# Patient Record
Sex: FEMALE | Race: WHITE | NOT HISPANIC OR LATINO
[De-identification: names, ages, dates, MRNs, and addresses within clinical notes are randomized per-mention and may not be internally consistent; named-entity substitution may affect disease eponyms.]

---

## 2019-11-06 ENCOUNTER — ASOB RESULT (OUTPATIENT)
Age: 69
End: 2019-11-06

## 2019-11-06 ENCOUNTER — APPOINTMENT (OUTPATIENT)
Dept: ANTEPARTUM | Facility: CLINIC | Age: 69
End: 2019-11-06
Payer: MEDICARE

## 2019-11-06 PROCEDURE — 76856 US EXAM PELVIC COMPLETE: CPT | Mod: 59

## 2019-11-06 PROCEDURE — 76830 TRANSVAGINAL US NON-OB: CPT

## 2020-10-07 ENCOUNTER — APPOINTMENT (OUTPATIENT)
Dept: ANTEPARTUM | Facility: CLINIC | Age: 70
End: 2020-10-07
Payer: MEDICARE

## 2020-10-07 ENCOUNTER — ASOB RESULT (OUTPATIENT)
Age: 70
End: 2020-10-07

## 2020-10-07 PROCEDURE — 76830 TRANSVAGINAL US NON-OB: CPT

## 2020-10-07 PROCEDURE — 76856 US EXAM PELVIC COMPLETE: CPT | Mod: 59

## 2021-03-09 ENCOUNTER — FORM ENCOUNTER (OUTPATIENT)
Age: 71
End: 2021-03-09

## 2021-03-10 ENCOUNTER — TRANSCRIPTION ENCOUNTER (OUTPATIENT)
Age: 71
End: 2021-03-10

## 2022-02-02 ENCOUNTER — APPOINTMENT (OUTPATIENT)
Dept: RHEUMATOLOGY | Facility: CLINIC | Age: 72
End: 2022-02-02

## 2022-02-08 ENCOUNTER — APPOINTMENT (OUTPATIENT)
Dept: RHEUMATOLOGY | Facility: CLINIC | Age: 72
End: 2022-02-08
Payer: MEDICARE

## 2022-02-08 ENCOUNTER — LABORATORY RESULT (OUTPATIENT)
Age: 72
End: 2022-02-08

## 2022-02-08 VITALS
WEIGHT: 162 LBS | TEMPERATURE: 98 F | RESPIRATION RATE: 17 BRPM | SYSTOLIC BLOOD PRESSURE: 140 MMHG | HEIGHT: 64 IN | DIASTOLIC BLOOD PRESSURE: 90 MMHG | BODY MASS INDEX: 27.66 KG/M2 | HEART RATE: 66 BPM | OXYGEN SATURATION: 98 %

## 2022-02-08 DIAGNOSIS — Z82.49 FAMILY HISTORY OF ISCHEMIC HEART DISEASE AND OTHER DISEASES OF THE CIRCULATORY SYSTEM: ICD-10-CM

## 2022-02-08 DIAGNOSIS — Z82.69 FAMILY HISTORY OF OTHER DISEASES OF THE MUSCULOSKELETAL SYSTEM AND CONNECTIVE TISSUE: ICD-10-CM

## 2022-02-08 DIAGNOSIS — Z86.39 PERSONAL HISTORY OF OTHER ENDOCRINE, NUTRITIONAL AND METABOLIC DISEASE: ICD-10-CM

## 2022-02-08 PROCEDURE — 99205 OFFICE O/P NEW HI 60 MIN: CPT

## 2022-02-08 RX ORDER — MULTIVIT-MIN/IRON/FOLIC ACID/K 18-600-40
CAPSULE ORAL
Refills: 0 | Status: ACTIVE | COMMUNITY

## 2022-02-08 RX ORDER — PNV NO.95/FERROUS FUM/FOLIC AC 28MG-0.8MG
TABLET ORAL
Refills: 0 | Status: ACTIVE | COMMUNITY

## 2022-02-08 RX ORDER — CALCIUM CITRATE/VITAMIN D3 200MG-6.25
TABLET ORAL
Refills: 0 | Status: ACTIVE | COMMUNITY

## 2022-02-09 LAB
ALBUMIN SERPL ELPH-MCNC: 4.7 G/DL
ALP BLD-CCNC: 60 U/L
ALT SERPL-CCNC: 12 U/L
ANION GAP SERPL CALC-SCNC: 11 MMOL/L
AST SERPL-CCNC: 17 U/L
BASOPHILS # BLD AUTO: 0.07 K/UL
BASOPHILS NFR BLD AUTO: 0.8 %
BILIRUB SERPL-MCNC: 0.4 MG/DL
BUN SERPL-MCNC: 17 MG/DL
CALCIUM SERPL-MCNC: 10.5 MG/DL
CCP AB SER IA-ACNC: <8 UNITS
CENTROMERE IGG SER-ACNC: <0.2 CD:130001892
CHLORIDE SERPL-SCNC: 104 MMOL/L
CK SERPL-CCNC: 71 U/L
CO2 SERPL-SCNC: 26 MMOL/L
CREAT SERPL-MCNC: 0.57 MG/DL
CRP SERPL-MCNC: <3 MG/L
ENA SCL70 IGG SER IA-ACNC: <0.2 AL
ENA SS-A AB SER IA-ACNC: 0.2 AL
ENA SS-B AB SER IA-ACNC: <0.2 AL
EOSINOPHIL # BLD AUTO: 0.11 K/UL
EOSINOPHIL NFR BLD AUTO: 1.3 %
ERYTHROCYTE [SEDIMENTATION RATE] IN BLOOD BY WESTERGREN METHOD: 41 MM/HR
GLUCOSE SERPL-MCNC: 84 MG/DL
HCT VFR BLD CALC: 47.6 %
HGB BLD-MCNC: 15.3 G/DL
IMM GRANULOCYTES NFR BLD AUTO: 0.2 %
LYMPHOCYTES # BLD AUTO: 2.77 K/UL
LYMPHOCYTES NFR BLD AUTO: 33.3 %
MAN DIFF?: NORMAL
MCHC RBC-ENTMCNC: 30.5 PG
MCHC RBC-ENTMCNC: 32.1 GM/DL
MCV RBC AUTO: 95 FL
MONOCYTES # BLD AUTO: 0.65 K/UL
MONOCYTES NFR BLD AUTO: 7.8 %
NEUTROPHILS # BLD AUTO: 4.69 K/UL
NEUTROPHILS NFR BLD AUTO: 56.6 %
PLATELET # BLD AUTO: 297 K/UL
POTASSIUM SERPL-SCNC: 4.4 MMOL/L
PROT SERPL-MCNC: 7.4 G/DL
RBC # BLD: 5.01 M/UL
RBC # FLD: 13.2 %
RF+CCP IGG SER-IMP: NEGATIVE
RHEUMATOID FACT SER QL: 11 IU/ML
SODIUM SERPL-SCNC: 141 MMOL/L
WBC # FLD AUTO: 8.31 K/UL

## 2022-02-09 NOTE — ASSESSMENT
[FreeTextEntry1] : 71 year old female was referred with pulmonary fibrosis.  She does not exhibit any obvious signs/symptoms of an underlying connective tissue disease at this time, other than dry eyes which can occur in Sjogren's Syndrome but is very non-specific.  I have therefore ordered some more bloodwork, including serologies, as further workup.\par She also c/o chronic pain in her knees, which was previously diagnosed as advanced osteoarthritis.  I have recommended conservative treatment, including exercises, ibuprofen and/or Tylenol prn, warm compresses, and topica; analgesics\par Pt also w/ reported hx of osteoporosis, though she says that she refused treatment.  She is due for a repeat DEXA later this month.  I will review the results once available, then will plan to further discuss treatment recommendations,

## 2022-02-09 NOTE — HISTORY OF PRESENT ILLNESS
[Arthralgias] : arthralgias [FreeTextEntry1] : 71 year old female with PMHx as listed below reports that about 1 year ago, she was incidentally found to have pulmonary fibrosis on a "renal scan".  She was referred to a pulmonologist.  PFT's were reportedly unremarkable.  She was then sent for a CT chest, which reportedly confirmed pulmonary fibrosis.  She denies any dyspnea.  (+)occasional mild dry cough.  \par Pt also c/o B/L knee pain (R>L) for >20 years - worst upon first getting up after prolonged sitting. (+)intermittent swelling.  (+)AM stiffness, usually lasting several minutes.  She saw ortho, who diagnosed her with OA. She was treated with corticosteroid injections, but they didn't help.  She was then treated with Orthovisc injections, which provided some relief.\par No F/C, no unintentional weight loss, no night sweats, no oral ulcers, no rashes, no alopecia, no photosensitivity, no dry eyes/dry mouth, no Raynaud symptoms, no focal weakness, no dysphagia\par  [Anorexia] : no anorexia [Weight Loss] : no weight loss [Malaise] : no malaise [Fever] : no fever [Chills] : no chills [Fatigue] : no fatigue [Malar Facial Rash] : no malar facial rash [Skin Lesions] : no lesions [Skin Nodules] : no skin nodules [Oral Ulcers] : no oral ulcers [Cough] : no cough [Dry Mouth] : no dry mouth [Dysphonia] : no dysphonia [Dysphagia] : no dysphagia [Shortness of Breath] : no shortness of breath [Chest Pain] : no chest pain [Joint Swelling] : no joint swelling [Joint Warmth] : no joint warmth [Joint Deformity] : no joint deformity [Decreased ROM] : no decreased range of motion [Morning Stiffness] : no morning stiffness [Falls] : no falls [Difficulty Standing] : no difficulty standing [Difficulty Walking] : no difficulty walking [Dyspnea] : no dyspnea [Myalgias] : no myalgias [Muscle Weakness] : no muscle weakness [Muscle Spasms] : no muscle spasms [Muscle Cramping] : no muscle cramping [Visual Changes] : no visual changes [Eye Pain] : no eye pain [Eye Redness] : no eye redness [Dry Eyes] : no dry eyes

## 2022-02-09 NOTE — PHYSICAL EXAM
[General Appearance - Alert] : alert [General Appearance - In No Acute Distress] : in no acute distress [Sclera] : the sclera and conjunctiva were normal [Outer Ear] : the ears and nose were normal in appearance [Oropharynx] : the oropharynx was normal [Neck Appearance] : the appearance of the neck was normal [Neck Cervical Mass (___cm)] : no neck mass was observed [Jugular Venous Distention Increased] : there was no jugular-venous distention [Thyroid Diffuse Enlargement] : the thyroid was not enlarged [Thyroid Nodule] : there were no palpable thyroid nodules [Heart Rate And Rhythm] : heart rate was normal and rhythm regular [Heart Sounds] : normal S1 and S2 [Heart Sounds Gallop] : no gallops [Murmurs] : no murmurs [Heart Sounds Pericardial Friction Rub] : no pericardial rub [Edema] : there was no peripheral edema [Bowel Sounds] : normal bowel sounds [Abdomen Soft] : soft [Abdomen Tenderness] : non-tender [Abdomen Mass (___ Cm)] : no abdominal mass palpated [Cervical Lymph Nodes Enlarged Posterior Bilaterally] : posterior cervical [Cervical Lymph Nodes Enlarged Anterior Bilaterally] : anterior cervical [Supraclavicular Lymph Nodes Enlarged Bilaterally] : supraclavicular [FreeTextEntry1] : No synovitis;  (+)B/L Heberden's nodes, though non-tender;  B/L knees w/ crepitus                                                                                                                                                                                                                                                                                                                                                                                                                                                                                                                                                                             [Skin Color & Pigmentation] : normal skin color and pigmentation [Skin Turgor] : normal skin turgor [] : no rash [No Focal Deficits] : no focal deficits [Oriented To Time, Place, And Person] : oriented to person, place, and time [Impaired Insight] : insight and judgment were intact [Affect] : the affect was normal

## 2022-02-10 LAB — ACE BLD-CCNC: 32 U/L

## 2022-02-11 LAB
ANA PAT FLD IF-IMP: ABNORMAL
ANA SER IF-ACNC: ABNORMAL

## 2022-02-14 LAB — RNA POLYMERASE III IGG: 9 UNITS

## 2022-02-24 LAB
EJ AB SER QL: NEGATIVE
ENA JO1 AB SER IA-ACNC: <20 UNITS
ENA PM/SCL AB SER-ACNC: <20 UNITS
ENA SM+RNP AB SER IA-ACNC: <20 UNITS
ENA SS-A IGG SER QL: <20 UNITS
FIBRILLARIN AB SER QL: NEGATIVE
KU AB SER QL: NEGATIVE
MDA-5 (P140)(CADM-140): <20 UNITS
MI2 AB SER QL: NEGATIVE
NXP-2 (P140): <20 UNITS
OJ AB SER QL: NEGATIVE
PL12 AB SER QL: NEGATIVE
PL7 AB SER QL: NEGATIVE
SRP AB SERPL QL: NEGATIVE
TIF GAMMA (P155/140): <20 UNITS
U2 SNRNP AB SER QL: NEGATIVE

## 2022-03-01 ENCOUNTER — APPOINTMENT (OUTPATIENT)
Dept: RHEUMATOLOGY | Facility: CLINIC | Age: 72
End: 2022-03-01
Payer: MEDICARE

## 2022-03-01 VITALS
SYSTOLIC BLOOD PRESSURE: 130 MMHG | RESPIRATION RATE: 17 BRPM | HEART RATE: 70 BPM | HEIGHT: 64 IN | BODY MASS INDEX: 28 KG/M2 | WEIGHT: 164 LBS | TEMPERATURE: 98 F | OXYGEN SATURATION: 98 % | DIASTOLIC BLOOD PRESSURE: 78 MMHG

## 2022-03-01 DIAGNOSIS — M25.561 PAIN IN RIGHT KNEE: ICD-10-CM

## 2022-03-01 DIAGNOSIS — H04.123 DRY EYE SYNDROME OF BILATERAL LACRIMAL GLANDS: ICD-10-CM

## 2022-03-01 DIAGNOSIS — M81.0 AGE-RELATED OSTEOPOROSIS W/OUT CURRENT PATHOLOGICAL FRACTURE: ICD-10-CM

## 2022-03-01 DIAGNOSIS — M54.2 CERVICALGIA: ICD-10-CM

## 2022-03-01 DIAGNOSIS — M89.49 OTHER HYPERTROPHIC OSTEOARTHROPATHY, MULTIPLE SITES: ICD-10-CM

## 2022-03-01 DIAGNOSIS — J84.10 PULMONARY FIBROSIS, UNSPECIFIED: ICD-10-CM

## 2022-03-01 DIAGNOSIS — R76.8 OTHER SPECIFIED ABNORMAL IMMUNOLOGICAL FINDINGS IN SERUM: ICD-10-CM

## 2022-03-01 PROCEDURE — 36415 COLL VENOUS BLD VENIPUNCTURE: CPT

## 2022-03-01 PROCEDURE — 99214 OFFICE O/P EST MOD 30 MIN: CPT | Mod: 25

## 2022-03-01 NOTE — PHYSICAL EXAM
[General Appearance - Alert] : alert [General Appearance - In No Acute Distress] : in no acute distress [Sclera] : the sclera and conjunctiva were normal [Outer Ear] : the ears and nose were normal in appearance [Oropharynx] : the oropharynx was normal [Neck Cervical Mass (___cm)] : no neck mass was observed [Neck Appearance] : the appearance of the neck was normal [Jugular Venous Distention Increased] : there was no jugular-venous distention [Thyroid Diffuse Enlargement] : the thyroid was not enlarged [Thyroid Nodule] : there were no palpable thyroid nodules [Heart Rate And Rhythm] : heart rate was normal and rhythm regular [Heart Sounds] : normal S1 and S2 [Heart Sounds Gallop] : no gallops [Murmurs] : no murmurs [Heart Sounds Pericardial Friction Rub] : no pericardial rub [Edema] : there was no peripheral edema [Bowel Sounds] : normal bowel sounds [Abdomen Soft] : soft [Abdomen Tenderness] : non-tender [Abdomen Mass (___ Cm)] : no abdominal mass palpated [Cervical Lymph Nodes Enlarged Posterior Bilaterally] : posterior cervical [Cervical Lymph Nodes Enlarged Anterior Bilaterally] : anterior cervical [Supraclavicular Lymph Nodes Enlarged Bilaterally] : supraclavicular [Skin Color & Pigmentation] : normal skin color and pigmentation [Skin Turgor] : normal skin turgor [] : no rash [No Focal Deficits] : no focal deficits [Oriented To Time, Place, And Person] : oriented to person, place, and time [Impaired Insight] : insight and judgment were intact [Affect] : the affect was normal [FreeTextEntry1] : No synovitis;  (+)B/L Heberden's nodes, though non-tender;  B/L knees w/ crepitus;  right knee w/ pain upon flexion/extension

## 2022-03-01 NOTE — HISTORY OF PRESENT ILLNESS
[Arthralgias] : arthralgias [FreeTextEntry1] : Feeling "the same" since last visit.  Still w/ B/L knee pain (R>L), unchanged.  Also w/ pain in her right hand.  No other new complaints. [Anorexia] : no anorexia [Weight Loss] : no weight loss [Malaise] : no malaise [Fever] : no fever [Chills] : no chills [Fatigue] : no fatigue [Malar Facial Rash] : no malar facial rash [Skin Lesions] : no lesions [Skin Nodules] : no skin nodules [Oral Ulcers] : no oral ulcers [Cough] : no cough [Dry Mouth] : no dry mouth [Dysphonia] : no dysphonia [Dysphagia] : no dysphagia [Shortness of Breath] : no shortness of breath [Chest Pain] : no chest pain [Joint Swelling] : no joint swelling [Joint Warmth] : no joint warmth [Joint Deformity] : no joint deformity [Decreased ROM] : no decreased range of motion [Morning Stiffness] : no morning stiffness [Falls] : no falls [Difficulty Standing] : no difficulty standing [Difficulty Walking] : no difficulty walking [Dyspnea] : no dyspnea [Myalgias] : no myalgias [Muscle Weakness] : no muscle weakness [Muscle Spasms] : no muscle spasms [Muscle Cramping] : no muscle cramping [Visual Changes] : no visual changes [Eye Pain] : no eye pain [Eye Redness] : no eye redness [Dry Eyes] : no dry eyes

## 2022-03-02 LAB
C3 SERPL-MCNC: 132 MG/DL
C4 SERPL-MCNC: 32 MG/DL
DSDNA AB SER-ACNC: <12 IU/ML
THYROGLOB AB SERPL-ACNC: <20 IU/ML
THYROPEROXIDASE AB SERPL IA-ACNC: <10 IU/ML

## 2022-03-03 LAB
ENA RNP AB SER IA-ACNC: <0.2 AL
ENA SM AB SER IA-ACNC: <0.2 AL

## 2022-07-26 ENCOUNTER — APPOINTMENT (OUTPATIENT)
Dept: RHEUMATOLOGY | Facility: CLINIC | Age: 72
End: 2022-07-26

## 2023-11-21 ENCOUNTER — EMERGENCY (EMERGENCY)
Facility: HOSPITAL | Age: 73
LOS: 1 days | Discharge: HOME | End: 2023-11-21
Attending: EMERGENCY MEDICINE
Payer: MEDICARE

## 2023-11-21 VITALS
HEART RATE: 89 BPM | TEMPERATURE: 97 F | DIASTOLIC BLOOD PRESSURE: 98 MMHG | OXYGEN SATURATION: 98 % | WEIGHT: 160.06 LBS | RESPIRATION RATE: 22 BRPM | SYSTOLIC BLOOD PRESSURE: 180 MMHG

## 2023-11-21 LAB
ALBUMIN SERPL ELPH-MCNC: 4.3 G/DL — SIGNIFICANT CHANGE UP (ref 3.3–5.2)
ALBUMIN SERPL ELPH-MCNC: 4.3 G/DL — SIGNIFICANT CHANGE UP (ref 3.3–5.2)
ALP SERPL-CCNC: 67 U/L — SIGNIFICANT CHANGE UP (ref 40–120)
ALP SERPL-CCNC: 67 U/L — SIGNIFICANT CHANGE UP (ref 40–120)
ALT FLD-CCNC: 12 U/L — SIGNIFICANT CHANGE UP
ALT FLD-CCNC: 12 U/L — SIGNIFICANT CHANGE UP
ANION GAP SERPL CALC-SCNC: 14 MMOL/L — SIGNIFICANT CHANGE UP (ref 5–17)
ANION GAP SERPL CALC-SCNC: 14 MMOL/L — SIGNIFICANT CHANGE UP (ref 5–17)
APTT BLD: 40.8 SEC — HIGH (ref 24.5–35.6)
APTT BLD: 40.8 SEC — HIGH (ref 24.5–35.6)
AST SERPL-CCNC: 15 U/L — SIGNIFICANT CHANGE UP
AST SERPL-CCNC: 15 U/L — SIGNIFICANT CHANGE UP
BASOPHILS # BLD AUTO: 0.06 K/UL — SIGNIFICANT CHANGE UP (ref 0–0.2)
BASOPHILS # BLD AUTO: 0.06 K/UL — SIGNIFICANT CHANGE UP (ref 0–0.2)
BASOPHILS NFR BLD AUTO: 0.5 % — SIGNIFICANT CHANGE UP (ref 0–2)
BASOPHILS NFR BLD AUTO: 0.5 % — SIGNIFICANT CHANGE UP (ref 0–2)
BILIRUB SERPL-MCNC: 0.3 MG/DL — LOW (ref 0.4–2)
BILIRUB SERPL-MCNC: 0.3 MG/DL — LOW (ref 0.4–2)
BLD GP AB SCN SERPL QL: SIGNIFICANT CHANGE UP
BLD GP AB SCN SERPL QL: SIGNIFICANT CHANGE UP
BUN SERPL-MCNC: 15.3 MG/DL — SIGNIFICANT CHANGE UP (ref 8–20)
BUN SERPL-MCNC: 15.3 MG/DL — SIGNIFICANT CHANGE UP (ref 8–20)
CALCIUM SERPL-MCNC: 10 MG/DL — SIGNIFICANT CHANGE UP (ref 8.4–10.5)
CALCIUM SERPL-MCNC: 10 MG/DL — SIGNIFICANT CHANGE UP (ref 8.4–10.5)
CHLORIDE SERPL-SCNC: 100 MMOL/L — SIGNIFICANT CHANGE UP (ref 96–108)
CHLORIDE SERPL-SCNC: 100 MMOL/L — SIGNIFICANT CHANGE UP (ref 96–108)
CO2 SERPL-SCNC: 26 MMOL/L — SIGNIFICANT CHANGE UP (ref 22–29)
CO2 SERPL-SCNC: 26 MMOL/L — SIGNIFICANT CHANGE UP (ref 22–29)
CREAT SERPL-MCNC: 0.63 MG/DL — SIGNIFICANT CHANGE UP (ref 0.5–1.3)
CREAT SERPL-MCNC: 0.63 MG/DL — SIGNIFICANT CHANGE UP (ref 0.5–1.3)
EGFR: 94 ML/MIN/1.73M2 — SIGNIFICANT CHANGE UP
EGFR: 94 ML/MIN/1.73M2 — SIGNIFICANT CHANGE UP
EOSINOPHIL # BLD AUTO: 0.13 K/UL — SIGNIFICANT CHANGE UP (ref 0–0.5)
EOSINOPHIL # BLD AUTO: 0.13 K/UL — SIGNIFICANT CHANGE UP (ref 0–0.5)
EOSINOPHIL NFR BLD AUTO: 1.1 % — SIGNIFICANT CHANGE UP (ref 0–6)
EOSINOPHIL NFR BLD AUTO: 1.1 % — SIGNIFICANT CHANGE UP (ref 0–6)
GLUCOSE SERPL-MCNC: 98 MG/DL — SIGNIFICANT CHANGE UP (ref 70–99)
GLUCOSE SERPL-MCNC: 98 MG/DL — SIGNIFICANT CHANGE UP (ref 70–99)
HCT VFR BLD CALC: 40.2 % — SIGNIFICANT CHANGE UP (ref 34.5–45)
HCT VFR BLD CALC: 40.2 % — SIGNIFICANT CHANGE UP (ref 34.5–45)
HGB BLD-MCNC: 12.9 G/DL — SIGNIFICANT CHANGE UP (ref 11.5–15.5)
HGB BLD-MCNC: 12.9 G/DL — SIGNIFICANT CHANGE UP (ref 11.5–15.5)
IMM GRANULOCYTES NFR BLD AUTO: 0.3 % — SIGNIFICANT CHANGE UP (ref 0–0.9)
IMM GRANULOCYTES NFR BLD AUTO: 0.3 % — SIGNIFICANT CHANGE UP (ref 0–0.9)
INR BLD: 2.31 RATIO — HIGH (ref 0.85–1.18)
INR BLD: 2.31 RATIO — HIGH (ref 0.85–1.18)
LYMPHOCYTES # BLD AUTO: 2.36 K/UL — SIGNIFICANT CHANGE UP (ref 1–3.3)
LYMPHOCYTES # BLD AUTO: 2.36 K/UL — SIGNIFICANT CHANGE UP (ref 1–3.3)
LYMPHOCYTES # BLD AUTO: 20.3 % — SIGNIFICANT CHANGE UP (ref 13–44)
LYMPHOCYTES # BLD AUTO: 20.3 % — SIGNIFICANT CHANGE UP (ref 13–44)
MCHC RBC-ENTMCNC: 29.5 PG — SIGNIFICANT CHANGE UP (ref 27–34)
MCHC RBC-ENTMCNC: 29.5 PG — SIGNIFICANT CHANGE UP (ref 27–34)
MCHC RBC-ENTMCNC: 32.1 GM/DL — SIGNIFICANT CHANGE UP (ref 32–36)
MCHC RBC-ENTMCNC: 32.1 GM/DL — SIGNIFICANT CHANGE UP (ref 32–36)
MCV RBC AUTO: 91.8 FL — SIGNIFICANT CHANGE UP (ref 80–100)
MCV RBC AUTO: 91.8 FL — SIGNIFICANT CHANGE UP (ref 80–100)
MONOCYTES # BLD AUTO: 1.02 K/UL — HIGH (ref 0–0.9)
MONOCYTES # BLD AUTO: 1.02 K/UL — HIGH (ref 0–0.9)
MONOCYTES NFR BLD AUTO: 8.8 % — SIGNIFICANT CHANGE UP (ref 2–14)
MONOCYTES NFR BLD AUTO: 8.8 % — SIGNIFICANT CHANGE UP (ref 2–14)
NEUTROPHILS # BLD AUTO: 8 K/UL — HIGH (ref 1.8–7.4)
NEUTROPHILS # BLD AUTO: 8 K/UL — HIGH (ref 1.8–7.4)
NEUTROPHILS NFR BLD AUTO: 69 % — SIGNIFICANT CHANGE UP (ref 43–77)
NEUTROPHILS NFR BLD AUTO: 69 % — SIGNIFICANT CHANGE UP (ref 43–77)
PLATELET # BLD AUTO: 325 K/UL — SIGNIFICANT CHANGE UP (ref 150–400)
PLATELET # BLD AUTO: 325 K/UL — SIGNIFICANT CHANGE UP (ref 150–400)
POTASSIUM SERPL-MCNC: 3.6 MMOL/L — SIGNIFICANT CHANGE UP (ref 3.5–5.3)
POTASSIUM SERPL-MCNC: 3.6 MMOL/L — SIGNIFICANT CHANGE UP (ref 3.5–5.3)
POTASSIUM SERPL-SCNC: 3.6 MMOL/L — SIGNIFICANT CHANGE UP (ref 3.5–5.3)
POTASSIUM SERPL-SCNC: 3.6 MMOL/L — SIGNIFICANT CHANGE UP (ref 3.5–5.3)
PROT SERPL-MCNC: 7.6 G/DL — SIGNIFICANT CHANGE UP (ref 6.6–8.7)
PROT SERPL-MCNC: 7.6 G/DL — SIGNIFICANT CHANGE UP (ref 6.6–8.7)
PROTHROM AB SERPL-ACNC: 25 SEC — HIGH (ref 9.5–13)
PROTHROM AB SERPL-ACNC: 25 SEC — HIGH (ref 9.5–13)
RBC # BLD: 4.38 M/UL — SIGNIFICANT CHANGE UP (ref 3.8–5.2)
RBC # BLD: 4.38 M/UL — SIGNIFICANT CHANGE UP (ref 3.8–5.2)
RBC # FLD: 12.9 % — SIGNIFICANT CHANGE UP (ref 10.3–14.5)
RBC # FLD: 12.9 % — SIGNIFICANT CHANGE UP (ref 10.3–14.5)
SODIUM SERPL-SCNC: 140 MMOL/L — SIGNIFICANT CHANGE UP (ref 135–145)
SODIUM SERPL-SCNC: 140 MMOL/L — SIGNIFICANT CHANGE UP (ref 135–145)
WBC # BLD: 11.61 K/UL — HIGH (ref 3.8–10.5)
WBC # BLD: 11.61 K/UL — HIGH (ref 3.8–10.5)
WBC # FLD AUTO: 11.61 K/UL — HIGH (ref 3.8–10.5)
WBC # FLD AUTO: 11.61 K/UL — HIGH (ref 3.8–10.5)

## 2023-11-21 PROCEDURE — 73700 CT LOWER EXTREMITY W/O DYE: CPT | Mod: 26,RT,MA

## 2023-11-21 PROCEDURE — 73564 X-RAY EXAM KNEE 4 OR MORE: CPT | Mod: 26,RT

## 2023-11-21 PROCEDURE — 93970 EXTREMITY STUDY: CPT | Mod: 26

## 2023-11-21 PROCEDURE — 99214 OFFICE O/P EST MOD 30 MIN: CPT

## 2023-11-21 PROCEDURE — 99223 1ST HOSP IP/OBS HIGH 75: CPT

## 2023-11-21 RX ORDER — HYDROMORPHONE HYDROCHLORIDE 2 MG/ML
0.5 INJECTION INTRAMUSCULAR; INTRAVENOUS; SUBCUTANEOUS ONCE
Refills: 0 | Status: DISCONTINUED | OUTPATIENT
Start: 2023-11-21 | End: 2023-11-21

## 2023-11-21 RX ORDER — ACETAMINOPHEN 500 MG
1000 TABLET ORAL ONCE
Refills: 0 | Status: COMPLETED | OUTPATIENT
Start: 2023-11-21 | End: 2023-11-21

## 2023-11-21 RX ORDER — HYDROMORPHONE HYDROCHLORIDE 2 MG/ML
2 INJECTION INTRAMUSCULAR; INTRAVENOUS; SUBCUTANEOUS ONCE
Refills: 0 | Status: DISCONTINUED | OUTPATIENT
Start: 2023-11-21 | End: 2023-11-21

## 2023-11-21 RX ORDER — ONDANSETRON 8 MG/1
4 TABLET, FILM COATED ORAL ONCE
Refills: 0 | Status: COMPLETED | OUTPATIENT
Start: 2023-11-21 | End: 2023-11-21

## 2023-11-21 RX ORDER — OXYCODONE HYDROCHLORIDE 5 MG/1
5 TABLET ORAL EVERY 6 HOURS
Refills: 0 | Status: DISCONTINUED | OUTPATIENT
Start: 2023-11-21 | End: 2023-11-22

## 2023-11-21 RX ORDER — ACETAMINOPHEN 500 MG
650 TABLET ORAL EVERY 6 HOURS
Refills: 0 | Status: DISCONTINUED | OUTPATIENT
Start: 2023-11-21 | End: 2023-11-29

## 2023-11-21 RX ORDER — HYDROMORPHONE HYDROCHLORIDE 2 MG/ML
1 INJECTION INTRAMUSCULAR; INTRAVENOUS; SUBCUTANEOUS ONCE
Refills: 0 | Status: DISCONTINUED | OUTPATIENT
Start: 2023-11-21 | End: 2023-11-21

## 2023-11-21 RX ADMIN — Medication 1000 MILLIGRAM(S): at 15:59

## 2023-11-21 RX ADMIN — HYDROMORPHONE HYDROCHLORIDE 0.5 MILLIGRAM(S): 2 INJECTION INTRAMUSCULAR; INTRAVENOUS; SUBCUTANEOUS at 15:59

## 2023-11-21 RX ADMIN — Medication 400 MILLIGRAM(S): at 15:25

## 2023-11-21 RX ADMIN — Medication 1000 MILLIGRAM(S): at 15:32

## 2023-11-21 RX ADMIN — HYDROMORPHONE HYDROCHLORIDE 0.5 MILLIGRAM(S): 2 INJECTION INTRAMUSCULAR; INTRAVENOUS; SUBCUTANEOUS at 18:32

## 2023-11-21 RX ADMIN — Medication 400 MILLIGRAM(S): at 21:23

## 2023-11-21 RX ADMIN — HYDROMORPHONE HYDROCHLORIDE 0.5 MILLIGRAM(S): 2 INJECTION INTRAMUSCULAR; INTRAVENOUS; SUBCUTANEOUS at 17:41

## 2023-11-21 RX ADMIN — Medication 1000 MILLIGRAM(S): at 11:55

## 2023-11-21 RX ADMIN — HYDROMORPHONE HYDROCHLORIDE 2 MILLIGRAM(S): 2 INJECTION INTRAMUSCULAR; INTRAVENOUS; SUBCUTANEOUS at 19:39

## 2023-11-21 RX ADMIN — ONDANSETRON 4 MILLIGRAM(S): 8 TABLET, FILM COATED ORAL at 15:25

## 2023-11-21 RX ADMIN — HYDROMORPHONE HYDROCHLORIDE 1 MILLIGRAM(S): 2 INJECTION INTRAMUSCULAR; INTRAVENOUS; SUBCUTANEOUS at 11:55

## 2023-11-21 RX ADMIN — HYDROMORPHONE HYDROCHLORIDE 1 MILLIGRAM(S): 2 INJECTION INTRAMUSCULAR; INTRAVENOUS; SUBCUTANEOUS at 21:23

## 2023-11-21 RX ADMIN — HYDROMORPHONE HYDROCHLORIDE 0.5 MILLIGRAM(S): 2 INJECTION INTRAMUSCULAR; INTRAVENOUS; SUBCUTANEOUS at 15:25

## 2023-11-21 RX ADMIN — HYDROMORPHONE HYDROCHLORIDE 2 MILLIGRAM(S): 2 INJECTION INTRAMUSCULAR; INTRAVENOUS; SUBCUTANEOUS at 19:06

## 2023-11-21 RX ADMIN — Medication 1000 MILLIGRAM(S): at 11:25

## 2023-11-21 NOTE — ED PROVIDER NOTE - MUSCULOSKELETAL, MLM
Spine appears normal, limited ROM to right lower extremity due to pain. + swelling noted to right kneecap and + tactile warmth.

## 2023-11-21 NOTE — ED PROVIDER NOTE - CLINICAL SUMMARY MEDICAL DECISION MAKING FREE TEXT BOX
Patient with recent diagnosis of right leg DVT on Xarelto x1 week s/p knee replacement 9 weeks ago presenting with sudden shooting pain to leg radiating upwards into thigh and down into foot. + pulses/sensation intact. Marked swelling noted to right knee. Incision site from TKR clean, dry, intact, no signs of infection. No gross erythema noted. ROM limited d/t pain. Denies SOB or CP.    Plan for CBC, CMP, PT/PTT/INR, T&S. US RLE r/o clot prorogation, hemarthrosis and pain control with Ofirmev. Patient with recent diagnosis of right leg DVT on Xarelto x1 week s/p knee replacement 9 weeks ago presenting with sudden shooting pain to leg radiating upwards into thigh and down into foot. + pulses/sensation intact. Marked swelling noted to right knee. Incision site from TKR clean, dry, intact, no signs of infection. No gross erythema noted. ROM limited d/t pain. Denies SOB or CP.    Plan for CBC, CMP, PT/PTT/INR, T&S. US RLE r/o clot prorogation, hemarthrosis and pain control with Ofirmev.    Orthopedist: Dr. Tim Marcus ([phone]) Spoke to Dr. Marcus who reports patient can follow-up with him in the office tomorrow. He does not believe there is a fracture based on xrays. CT knee does not show fracture and U/S LEs without DVT. Will keep in observation to see pain management and PT. Despite there being no fracture on CT, Dr. Reyez wants patient in knee immobilizer and to be none weight bearing. Will place patient in knee immobilizer.

## 2023-11-21 NOTE — ED ADULT NURSE NOTE - NSFALLRISKINTERV_ED_ALL_ED

## 2023-11-21 NOTE — CONSULT NOTE ADULT - NS ATTEND AMEND GEN_ALL_CORE FT
Agree with PA note and plan as written.  Patient with CT completed and noted to have significant artifact.  Patient with large knee joint effusion with inability to bear weight.  Patient CT read as negative but at this time ct reviewed and sagittal and axials noted to have signs of periprosthetic fracture.  Per ortho PA, treating surgeon contacted.  Patient with significant pain and knee immobilizer applied for immobilization for closed treatment of distal femur fracture and will continue NWB.  Can follow up as outpatient and to continue closed treatment of distal femur fracture with bracing and NWB.

## 2023-11-21 NOTE — ED CDU PROVIDER INITIAL DAY NOTE - CLINICAL SUMMARY MEDICAL DECISION MAKING FREE TEXT BOX
72 y/o female with pmhx ILD, s/p R knee replacement x9 weeks ago, RLE DVT dx 11/2 on xarelto presents to ED with c/o sudden shooting pain to right knee radiating upward into thigh as well as downwards into the foot while standing on line in [place]'s with associated swelling of R knee.  On exam significant R knee swelling, limited ROM.  Xray and CT knee performed showing no acute fx, +hemarthrosis.    ER team spoke with pt's Orthopedist: Dr. Tim Marcus ([phone]) who reports patient can follow-up with him in the office tomorrow. He does not believe there is a fracture based on xrays. CT knee does not show fracture and U/S LEs without DVT.   Our orthopedic team Dr. Reyez wants patient in knee immobilizer and to be non-weight bearing.     Pt placed on observation for pain control and PT eval, MRI knee also ordered for further evaluation.  Will hold xarelto for now 74 y/o female with pmhx ILD, s/p R knee replacement x9 weeks ago, RLE DVT dx 11/2 on xarelto presents to ED with c/o sudden shooting pain to right knee radiating upward into thigh as well as downwards into the foot while standing on line in [place]'s with associated swelling of R knee.  On exam significant R knee swelling, limited ROM.  Xray and CT knee performed showing no acute fx, +hemarthrosis.    ER team spoke with pt's Orthopedist: Dr. Tim Marcus ([phone]) who reports patient can follow-up with him in the office tomorrow. He does not believe there is a fracture based on xrays. CT knee does not show fracture and U/S LEs without DVT.   Our orthopedic team Dr. Reyez wants patient in knee immobilizer and to be non-weight bearing.     Pt placed on observation for pain control and PT eval, MRI knee also ordered for further evaluation.  Will hold xarelto for now 74 y/o female with pmhx ILD, s/p R knee replacement x9 weeks ago, RLE DVT dx 11/2 on xarelto presents to ED with c/o sudden shooting pain to right knee radiating upward into thigh as well as downwards into the foot while standing on line in [place]'s with associated swelling of R knee.  On exam significant R knee swelling, limited ROM.  Xray and CT knee performed showing no acute fx, +hemarthrosis.    ER team spoke with pt's Orthopedist: Dr. Tim Marcus ([phone]) who reports patient can follow-up with him in the office tomorrow. He does not believe there is a fracture based on xrays. CT knee does not show fracture and U/S LEs without DVT.   Our orthopedic team Dr. Reyez wants patient in knee immobilizer and to be non-weight bearing.     Pt placed on observation for pain control and PT eval, MRI knee also ordered for further evaluation.  Will hold xarelto for now

## 2023-11-21 NOTE — ED ADULT NURSE NOTE - OBJECTIVE STATEMENT
pt a&o x4, RR even and unlabored, skin warm and dry with noted redness and swelling to right knee. pt states she had a knee replacement 9 weeks ago and was diagnose with a DVT 2 weeks ago. pt states she was started on Eliquis. today she was walking in [facility] when all of a sudden she experienced extreme pain and was unable to walk. Pt IV placed, labs sent, and medicated as per EMR.

## 2023-11-21 NOTE — ED ADULT TRIAGE NOTE - CHIEF COMPLAINT QUOTE
was standing at the store, shooting pain up R leg, known DVT diagnosed 1 week ago, on Xarelto, had knee surgery in september/ + pedal pulse

## 2023-11-21 NOTE — ED PROVIDER NOTE - OBJECTIVE STATEMENT
74 y/o female with pmhx ILD, DVT dx last week started on Eliquis x1 week ago s/p knee replacement x9 weeks ago presenting to ED with c/o sudden shooting pain to right knee radiating upward into thigh as well as downwards into the foot while standing on line in [place]'s. Denies CP, SOB. Has been taking Xarelto as prescribed. 72 y/o female with pmhx ILD, DVT dx last week started on Eliquis x1 week ago s/p knee replacement x9 weeks ago presenting to ED with c/o sudden shooting pain to right knee radiating upward into thigh as well as downwards into the foot while standing on line in [place]'s accompanied by swelling to kneecap. Patient reports she was not swollen like this up until this point when she felt the sudden shooting sensation at the mall. Denies CP, SOB. Has been taking Xarelto as prescribed. 72 y/o female with pmhx ILD, DVT dx last week started on Eliquis x1 week ago s/p knee replacement x9 weeks ago presenting to ED with c/o sudden shooting pain to right knee radiating upward into thigh as well as downwards into the foot while standing on line in [place]'s accompanied by swelling to kneecap descending down the leg into the calf. Patient reports she was not swollen like this up until this point when she felt the sudden shooting sensation at the mall. Denies CP, SOB. Has been taking Xarelto as prescribed. 72 y/o female with pmhx ILD, DVT dx last week started on Eliquis x1 week ago s/p knee replacement x9 weeks ago presenting to ED with c/o sudden shooting pain to right knee radiating upward into thigh as well as downwards into the foot while standing on line in [place]'s accompanied by swelling to kneecap descending down the leg into the calf. Patient reports she was not swollen like this up until this point when she felt the sudden shooting sensation at the mall. Denies CP, SOB. Has been taking Xarelto as prescribed.    Dr. Tim Marcus ([phone])

## 2023-11-21 NOTE — ED PROVIDER NOTE - ENMT NEGATIVE STATEMENT, MLM
Ears: no ear pain and no hearing problems. Nose: no nasal congestion and no nasal drainage. Mouth/Throat: no dysphagia, no hoarseness and no throat pain. Neck: no lumps, no pain, no stiffness

## 2023-11-21 NOTE — CONSULT NOTE ADULT - SUBJECTIVE AND OBJECTIVE BOX
Pt Name: INDRA RESENDIZ    MRN: [id number]      Patient is a 73y Female c/o right knee pain x today. Patient states she had a right TKA done with Dr. Forbes at [facility] approx 9 weeks ago. Patient states 10 days ago she was diagnosed with a RLE DVT and was placed on Xarelto 15mg twice daily. Patient state she was in [place] today, was just standing, when she had sudden pain in her right knee. Patient denies recent trauma to the knee. Denies numbness/tingling. No other complaints at this time. Denies fever/chills    HEALTH ISSUES - PROBLEM Dx:      .      REVIEW OF SYSTEMS      General:	denies fever/chills    Respiratory and Thorax: denies SOB  	  Cardiovascular:	denies CP    Gastrointestinal:	denies abd pain    Musculoskeletal:	 see HPI    Neurological:	denies numbness/tingling      ROS is otherwise negative.    PAST MEDICAL & SURGICAL HISTORY:  PAST MEDICAL & SURGICAL HISTORY:      Allergies: penicillin (Unknown)      Medications: HYDROmorphone   Tablet 2 milliGRAM(s) Oral once      FAMILY HISTORY:  : non-contributory    Social History:     Ambulation: denies illicit drug use                        12.9   11.61 )-----------( 325      ( 21 Nov 2023 15:23 )             40.2     11-21    140  |  100  |  15.3  ----------------------------<  98  3.6   |  26.0  |  0.63    Ca    10.0      21 Nov 2023 15:23    TPro  7.6  /  Alb  4.3  /  TBili  0.3<L>  /  DBili  x   /  AST  15  /  ALT  12  /  AlkPhos  67  11-21      PHYSICAL EXAM:    Vital Signs Last 24 Hrs  T(C): 36.7 (21 Nov 2023 16:23), Max: 36.7 (21 Nov 2023 16:23)  T(F): 98.1 (21 Nov 2023 16:23), Max: 98.1 (21 Nov 2023 16:23)  HR: 76 (21 Nov 2023 16:23) (76 - 89)  BP: 177/90 (21 Nov 2023 16:23) (177/90 - 180/98)  BP(mean): --  RR: 18 (21 Nov 2023 16:23) (18 - 22)  SpO2: 99% (21 Nov 2023 16:23) (98% - 99%)    Parameters below as of 21 Nov 2023 16:23  Patient On (Oxygen Delivery Method): room air      Daily     Daily     Appearance: Alert, responsive, in no acute distress.    Neurological: Sensation is grossly intact to light touch.    Skin: no rash on visible skin. Skin is clean, dry and intact. No bleeding. No abrasions. No ulcerations.    Vascular: 2+ distal pulses. Cap refill < 2 sec. No signs of venous insuffiency or stasis. No extremity ulcerations. No cyanosis.    Musculoskeletal:         Right Lower Extremity: + right knee joint effusion. incision noted, healing well, no dehiscence. no erythema. Patient unwilling to attempt AROM of right knee at this time due to pain. SILT. + dorsi/plantarflexion. DP 2+. calf soft NT B/L    Imaging Studies:  xray right knee reviewed with Dr. Randall: ?periprosthetic fx    A/P:  Pt is a  73y Female with right knee pain    PLAN:   D/W Dr. Randall  recommend KI and NWB RLE  recommend CT right knee  ED to reach out to primary surgeon  further planning pending CT results

## 2023-11-21 NOTE — ED PROVIDER NOTE - CONSTITUTIONAL, MLM
normal... Well appearing, awake, alert, oriented to person, place, time/situation. In apparent discomfort.

## 2023-11-21 NOTE — ED CDU PROVIDER INITIAL DAY NOTE - ATTENDING APP SHARED VISIT CONTRIBUTION OF CARE
I, Dinesh Estrella, have personally performed a face to face diagnostic evaluation on this patient. I have reviewed the YOUSUF note and agree with the history, exam and plan of care, except as noted.    74 y/o female with pmhx ILD, s/p R knee replacement x9 weeks ago, RLE DVT dx 11/2 on xarelto With sudden onset of right knee pain.  X-ray and CT showed no acute fracture, CT showed hemarthrosis.  Patient evaluated by Ortho, no acute intervention needed at this point.  Recommend to hold Xarelto.  Patient placed in observation for MRI and PT eval.

## 2023-11-21 NOTE — ED CDU PROVIDER INITIAL DAY NOTE - OBJECTIVE STATEMENT
74 y/o female with pmhx ILD, s/p R knee replacement x9 weeks ago, RLE DVT dx 11/2 on xarelto presents to ED with c/o sudden shooting pain to right knee radiating upward into thigh as well as downwards into the foot while standing on line in [place]'s accompanied by swelling to kneecap descending down the leg into the calf. Patient reports she was not swollen like this up until this point when she felt the sudden [place] the mall. Denies CP, SOB. Has been taking Xarelto as prescribed 15mg BID.     Ortho Dr. Tim Marcus ([phone]) 74 y/o female with pmhx ILD, s/p R knee replacement x9 weeks ago, RLE DVT dx 11/2 on xarelto presents to ED with c/o sudden shooting pain to right knee radiating upward into thigh as well as downwards into the foot while standing on line in [place]'s accompanied by swelling to kneecap descending down the leg into the calf. Patient reports she was not swollen like this up until this point when she felt the sudden [place] sensation [place] the mall. Denies CP, SOB. Has been taking Xarelto as prescribed 15mg BID.     Ortho Dr. Tim Marcus ([phone]) 72 y/o female with pmhx ILD, s/p R knee replacement x9 weeks ago, RLE DVT dx 11/2 on xarelto presents to ED with c/o sudden shooting pain to right knee radiating upward into thigh as well as downwards into the foot while standing on line in [place]'s accompanied by swelling to kneecap descending down the leg into the calf. Patient reports she was not swollen like this up until this point when she felt the sudden shooting sensation at the mall. Denies CP, SOB. Has been taking Xarelto as prescribed 15mg BID.     Ortho Dr. Tim Marcus ([phone])

## 2023-11-21 NOTE — ED PROVIDER NOTE - ATTENDING CONTRIBUTION TO CARE
I, Lakia Thomas, have personally seen and examined this patient. I have fully participated in the care of this patient. I have reviewed all pertinent clinical information, including history, physical exam, plan and the Resident's note and agree except as noted below.     Patient with knee replacement approximately 9 weeks ago 10 days ago diagnosed with DVT started on Xarelto today atraumatic right knee pain with significant swelling 10 out of 10.  Feels like the swelling is extending into her calf.  No weakness but unable to walk due to the pain.  On exam significant right knee effusion.  Normal DP pulse calf soft.  5 out of 5 ankle strength.  Limited evaluation of the rest of the extremity due to pain.  Given IV medications with improvement.  X-ray without acute findings does have a chronic bony island noted on the lateral view.  Pending sono    Patient signed out to incoming physician pending results of testing and reassessment.  All decisions regarding the progression of care will be made at their discretion. I, Lakia Thomas, have personally seen and examined this patient. I have fully participated in the care of this patient. I have reviewed all pertinent clinical information, including history, physical exam, plan and the Resident's note and agree except as noted below.   I, Lakia Thomas, have personally seen and examined this patient. I have fully participated in the care of this patient. I have reviewed all pertinent clinical information, including history, physical exam, plan and the Medical Student's note and agree except as noted below.     Patient with knee replacement approximately 9 weeks ago 10 days ago diagnosed with DVT started on Xarelto today atraumatic right knee pain with significant swelling 10 out of 10.  Feels like the swelling is extending into her calf.  No weakness but unable to walk due to the pain.  On exam significant right knee effusion.  Normal DP pulse calf soft.  5 out of 5 ankle strength.  Limited evaluation of the rest of the extremity due to pain.  Given IV medications with improvement.  X-ray without acute findings does have a chronic bony island noted on the lateral view.  Pending sono    Patient signed out to incoming physician pending results of testing and reassessment.  All decisions regarding the progression of care will be made at their discretion.

## 2023-11-22 PROCEDURE — 99231 SBSQ HOSP IP/OBS SF/LOW 25: CPT

## 2023-11-22 RX ORDER — HYDROMORPHONE HYDROCHLORIDE 2 MG/ML
2 INJECTION INTRAMUSCULAR; INTRAVENOUS; SUBCUTANEOUS EVERY 6 HOURS
Refills: 0 | Status: COMPLETED | OUTPATIENT
Start: 2023-11-22 | End: 2023-11-29

## 2023-11-22 RX ORDER — RIVAROXABAN 15 MG-20MG
15 KIT ORAL
Refills: 0 | Status: DISCONTINUED | OUTPATIENT
Start: 2023-11-22 | End: 2023-11-22

## 2023-11-22 RX ADMIN — HYDROMORPHONE HYDROCHLORIDE 2 MILLIGRAM(S): 2 INJECTION INTRAMUSCULAR; INTRAVENOUS; SUBCUTANEOUS at 13:45

## 2023-11-22 RX ADMIN — HYDROMORPHONE HYDROCHLORIDE 2 MILLIGRAM(S): 2 INJECTION INTRAMUSCULAR; INTRAVENOUS; SUBCUTANEOUS at 12:41

## 2023-11-22 RX ADMIN — Medication 650 MILLIGRAM(S): at 23:25

## 2023-11-22 RX ADMIN — HYDROMORPHONE HYDROCHLORIDE 2 MILLIGRAM(S): 2 INJECTION INTRAMUSCULAR; INTRAVENOUS; SUBCUTANEOUS at 23:58

## 2023-11-22 RX ADMIN — Medication 650 MILLIGRAM(S): at 07:00

## 2023-11-22 RX ADMIN — RIVAROXABAN 15 MILLIGRAM(S): KIT at 15:10

## 2023-11-22 RX ADMIN — HYDROMORPHONE HYDROCHLORIDE 2 MILLIGRAM(S): 2 INJECTION INTRAMUSCULAR; INTRAVENOUS; SUBCUTANEOUS at 18:13

## 2023-11-22 RX ADMIN — HYDROMORPHONE HYDROCHLORIDE 2 MILLIGRAM(S): 2 INJECTION INTRAMUSCULAR; INTRAVENOUS; SUBCUTANEOUS at 23:24

## 2023-11-22 RX ADMIN — Medication 650 MILLIGRAM(S): at 08:26

## 2023-11-22 RX ADMIN — Medication 650 MILLIGRAM(S): at 23:58

## 2023-11-22 RX ADMIN — Medication 650 MILLIGRAM(S): at 18:13

## 2023-11-22 RX ADMIN — Medication 650 MILLIGRAM(S): at 13:45

## 2023-11-22 RX ADMIN — Medication 650 MILLIGRAM(S): at 12:41

## 2023-11-22 NOTE — PROVIDER CONTACT NOTE (OTHER) - ASSESSMENT
PT order received, chart reviewed and contents noted.  as per PA, pt awaiting MRI, requesting PT hold off until MRI is completed in order to establish an accurate weightbearing status. Will continue to follow.

## 2023-11-23 LAB
HCT VFR BLD CALC: 35.8 % — SIGNIFICANT CHANGE UP (ref 34.5–45)
HCT VFR BLD CALC: 35.8 % — SIGNIFICANT CHANGE UP (ref 34.5–45)
HGB BLD-MCNC: 11.6 G/DL — SIGNIFICANT CHANGE UP (ref 11.5–15.5)
HGB BLD-MCNC: 11.6 G/DL — SIGNIFICANT CHANGE UP (ref 11.5–15.5)
MCHC RBC-ENTMCNC: 29.7 PG — SIGNIFICANT CHANGE UP (ref 27–34)
MCHC RBC-ENTMCNC: 29.7 PG — SIGNIFICANT CHANGE UP (ref 27–34)
MCHC RBC-ENTMCNC: 32.4 GM/DL — SIGNIFICANT CHANGE UP (ref 32–36)
MCHC RBC-ENTMCNC: 32.4 GM/DL — SIGNIFICANT CHANGE UP (ref 32–36)
MCV RBC AUTO: 91.8 FL — SIGNIFICANT CHANGE UP (ref 80–100)
MCV RBC AUTO: 91.8 FL — SIGNIFICANT CHANGE UP (ref 80–100)
PLATELET # BLD AUTO: 257 K/UL — SIGNIFICANT CHANGE UP (ref 150–400)
PLATELET # BLD AUTO: 257 K/UL — SIGNIFICANT CHANGE UP (ref 150–400)
RBC # BLD: 3.9 M/UL — SIGNIFICANT CHANGE UP (ref 3.8–5.2)
RBC # BLD: 3.9 M/UL — SIGNIFICANT CHANGE UP (ref 3.8–5.2)
RBC # FLD: 13.2 % — SIGNIFICANT CHANGE UP (ref 10.3–14.5)
RBC # FLD: 13.2 % — SIGNIFICANT CHANGE UP (ref 10.3–14.5)
WBC # BLD: 8.86 K/UL — SIGNIFICANT CHANGE UP (ref 3.8–10.5)
WBC # BLD: 8.86 K/UL — SIGNIFICANT CHANGE UP (ref 3.8–10.5)
WBC # FLD AUTO: 8.86 K/UL — SIGNIFICANT CHANGE UP (ref 3.8–10.5)
WBC # FLD AUTO: 8.86 K/UL — SIGNIFICANT CHANGE UP (ref 3.8–10.5)

## 2023-11-23 PROCEDURE — 73721 MRI JNT OF LWR EXTRE W/O DYE: CPT | Mod: 26,RT,MA

## 2023-11-23 PROCEDURE — 99232 SBSQ HOSP IP/OBS MODERATE 35: CPT

## 2023-11-23 RX ORDER — MIRTAZAPINE 45 MG/1
1 TABLET, ORALLY DISINTEGRATING ORAL
Refills: 0 | DISCHARGE

## 2023-11-23 RX ORDER — DONEPEZIL HYDROCHLORIDE 10 MG/1
1 TABLET, FILM COATED ORAL
Refills: 0 | DISCHARGE

## 2023-11-23 RX ORDER — OLANZAPINE 15 MG/1
1 TABLET, FILM COATED ORAL
Refills: 0 | DISCHARGE

## 2023-11-23 RX ORDER — VALSARTAN 80 MG/1
1 TABLET ORAL
Refills: 0 | DISCHARGE

## 2023-11-23 RX ORDER — CLONAZEPAM 1 MG
1 TABLET ORAL
Refills: 0 | DISCHARGE

## 2023-11-23 RX ORDER — LAMOTRIGINE 25 MG/1
1 TABLET, ORALLY DISINTEGRATING ORAL
Refills: 0 | DISCHARGE

## 2023-11-23 RX ORDER — CLOPIDOGREL BISULFATE 75 MG/1
1 TABLET, FILM COATED ORAL
Refills: 0 | DISCHARGE

## 2023-11-23 RX ORDER — ROSUVASTATIN CALCIUM 5 MG/1
1 TABLET ORAL
Refills: 0 | DISCHARGE

## 2023-11-23 RX ADMIN — HYDROMORPHONE HYDROCHLORIDE 2 MILLIGRAM(S): 2 INJECTION INTRAMUSCULAR; INTRAVENOUS; SUBCUTANEOUS at 11:19

## 2023-11-23 RX ADMIN — Medication 650 MILLIGRAM(S): at 05:55

## 2023-11-23 RX ADMIN — Medication 650 MILLIGRAM(S): at 11:19

## 2023-11-23 RX ADMIN — HYDROMORPHONE HYDROCHLORIDE 2 MILLIGRAM(S): 2 INJECTION INTRAMUSCULAR; INTRAVENOUS; SUBCUTANEOUS at 06:20

## 2023-11-23 RX ADMIN — Medication 650 MILLIGRAM(S): at 06:20

## 2023-11-23 RX ADMIN — HYDROMORPHONE HYDROCHLORIDE 2 MILLIGRAM(S): 2 INJECTION INTRAMUSCULAR; INTRAVENOUS; SUBCUTANEOUS at 05:55

## 2023-11-23 NOTE — ED ADULT NURSE REASSESSMENT NOTE - NSFALLHARMRISKINTERV_ED_ALL_ED

## 2023-11-23 NOTE — ED CDU PROVIDER SUBSEQUENT DAY NOTE - PROGRESS NOTE DETAILS
Patient evaluated at bedside on am DO/PA rounds. Patient sitting in room with no acute distress. Pain is persisting but controlled. Leg is in brace. Plan for MRI, called MRI. Patient agreeable to plan. Patient at MRI. Will check results of MRI for plan. Patient stable. Pending MRI results. Care signed out to Night team.

## 2023-11-23 NOTE — PROVIDER CONTACT NOTE (OTHER) - ASSESSMENT
PT orders received. Chart reviewed, contents noted. Per previous PT note: PA requested PT hold off until MRI is completed in order to establish appropriate weight bearing restrictions. MRI remains pending at this time. PT will continue to hold.

## 2023-11-23 NOTE — ED ADULT NURSE REASSESSMENT NOTE - COMFORT CARE
po fluids offered
assisted to bathroom/plan of care explained/po fluids offered/repositioned/side rails up/treatment delay explained/wait time explained

## 2023-11-24 VITALS
RESPIRATION RATE: 18 BRPM | SYSTOLIC BLOOD PRESSURE: 173 MMHG | HEART RATE: 92 BPM | TEMPERATURE: 98 F | DIASTOLIC BLOOD PRESSURE: 95 MMHG | OXYGEN SATURATION: 97 %

## 2023-11-24 PROCEDURE — 85027 COMPLETE CBC AUTOMATED: CPT

## 2023-11-24 PROCEDURE — 96376 TX/PRO/DX INJ SAME DRUG ADON: CPT

## 2023-11-24 PROCEDURE — 85730 THROMBOPLASTIN TIME PARTIAL: CPT

## 2023-11-24 PROCEDURE — 86850 RBC ANTIBODY SCREEN: CPT

## 2023-11-24 PROCEDURE — 96374 THER/PROPH/DIAG INJ IV PUSH: CPT

## 2023-11-24 PROCEDURE — 85025 COMPLETE CBC W/AUTO DIFF WBC: CPT

## 2023-11-24 PROCEDURE — 96375 TX/PRO/DX INJ NEW DRUG ADDON: CPT

## 2023-11-24 PROCEDURE — 99284 EMERGENCY DEPT VISIT MOD MDM: CPT | Mod: 25

## 2023-11-24 PROCEDURE — G0378: CPT

## 2023-11-24 PROCEDURE — 73721 MRI JNT OF LWR EXTRE W/O DYE: CPT | Mod: MA

## 2023-11-24 PROCEDURE — 85610 PROTHROMBIN TIME: CPT

## 2023-11-24 PROCEDURE — 86900 BLOOD TYPING SEROLOGIC ABO: CPT

## 2023-11-24 PROCEDURE — 73700 CT LOWER EXTREMITY W/O DYE: CPT | Mod: MA

## 2023-11-24 PROCEDURE — 99238 HOSP IP/OBS DSCHRG MGMT 30/<: CPT

## 2023-11-24 PROCEDURE — 80053 COMPREHEN METABOLIC PANEL: CPT

## 2023-11-24 PROCEDURE — 73564 X-RAY EXAM KNEE 4 OR MORE: CPT

## 2023-11-24 PROCEDURE — 36415 COLL VENOUS BLD VENIPUNCTURE: CPT

## 2023-11-24 PROCEDURE — 93970 EXTREMITY STUDY: CPT

## 2023-11-24 PROCEDURE — 86901 BLOOD TYPING SEROLOGIC RH(D): CPT

## 2023-11-24 RX ORDER — SENNA PLUS 8.6 MG/1
2 TABLET ORAL AT BEDTIME
Refills: 0 | Status: DISCONTINUED | OUTPATIENT
Start: 2023-11-24 | End: 2023-11-29

## 2023-11-24 RX ORDER — NALOXONE HYDROCHLORIDE 4 MG/.1ML
1 SPRAY NASAL ONCE
Refills: 0 | Status: DISCONTINUED | OUTPATIENT
Start: 2023-11-24 | End: 2023-11-24

## 2023-11-24 RX ORDER — HYDROMORPHONE HYDROCHLORIDE 2 MG/ML
1 INJECTION INTRAMUSCULAR; INTRAVENOUS; SUBCUTANEOUS
Qty: 9 | Refills: 0
Start: 2023-11-24 | End: 2023-11-26

## 2023-11-24 RX ADMIN — Medication 650 MILLIGRAM(S): at 00:45

## 2023-11-24 RX ADMIN — Medication 650 MILLIGRAM(S): at 06:04

## 2023-11-24 RX ADMIN — Medication 650 MILLIGRAM(S): at 11:14

## 2023-11-24 RX ADMIN — Medication 650 MILLIGRAM(S): at 17:33

## 2023-11-24 RX ADMIN — Medication 650 MILLIGRAM(S): at 00:01

## 2023-11-24 NOTE — PHYSICAL THERAPY INITIAL EVALUATION ADULT - GENERAL OBSERVATIONS, REHAB EVAL
Pt received in chair +right knee immobilizer on room air with daughter at bedside, pleasant and cooperative

## 2023-11-24 NOTE — ED CDU PROVIDER DISPOSITION NOTE - CARE PROVIDER_API CALL
Moo Pathak  Orthopaedic Surgery  [address] [place] [address]  Phone: [phone]  Fax: [phone]  Follow Up Time: Routine

## 2023-11-24 NOTE — ED CDU PROVIDER SUBSEQUENT DAY NOTE - PHYSICAL EXAMINATION
Gen: No acute distress, non toxic  HEENT: Mucous membranes moist, pink conjunctivae, EOMI  CV: RRR, nl s1/s2.  Resp: CTAB, normal rate and effort  GI: Abdomen soft, NT, ND. No rebound, no guarding  Neuro: A&O x 3, moving all 4 extremities  MSK: +rt knee immobilizer and ace wrap in place. +swelling to rt knee.   Skin: +scar to the rt knee. No rashes. intact and perfused.
Gen: No acute distress, non toxic  HEENT: Mucous membranes moist, pink conjunctivae, EOMI  CV: RRR, nl s1/s2.  Resp: CTAB, normal rate and effort  GI: Abdomen soft, NT, ND. No rebound, no guarding  Neuro: A&O x 3, moving all 4 extremities  MSK: +swelling right knee, TTP. placed in knee immobilizer.  No spine or joint tenderness to palpation  Skin: No rashes. intact and perfused.   Vascular: Radial and dorsalis pedal pulses 2+ b/l

## 2023-11-24 NOTE — ED CDU PROVIDER SUBSEQUENT DAY NOTE - CLINICAL SUMMARY MEDICAL DECISION MAKING FREE TEXT BOX
74 y/o female with pmhx ILD, s/p R knee replacement x9 weeks ago, RLE DVT dx 11/2 on xarelto presents to ED with c/o sudden right knee pain/swelling  Xray and CT knee performed showing no acute fx, +hemarthrosis.   CT knee no acute fracture and U/S LEs without DVT.   Our orthopedic team Dr. Reyez wants patient in knee immobilizer and to be non-weight bearing.   Pt placed on observation for pain control and PT eval, MRI knee also ordered for further evaluation.  Xarelto held in meantime.
72 y/o female with pmhx ILD, s/p R knee replacement x9 weeks ago, RLE DVT dx 11/2 on xarelto presents to ED with c/o sudden right knee pain/swelling  Xray and CT knee performed showing no acute fx, +hemarthrosis.   CT knee no acute fracture and U/S LEs without DVT.   Our orthopedic team Dr. Reyez wants patient in knee immobilizer and to be non-weight bearing.   Pt placed on observation for pain control and PT eval, MRI knee also ordered for further evaluation.  Will hold xarelto
72 y/o female with pmhx ILD, s/p R knee replacement x9 weeks ago, RLE DVT dx 11/2 on xarelto presents to ED with c/o sudden shooting pain to right knee radiating upward into thigh as well as downwards into the foot while standing on line in [place]'s with associated swelling of R knee.  On exam significant R knee swelling, limited ROM.  Xray and CT knee performed showing no acute fx, +hemarthrosis.    ER team spoke with pt's Orthopedist: Dr. Tim Marcus ([phone]) who reports patient can follow-up with him in the office tomorrow. He does not believe there is a fracture based on xrays. CT knee does not show fracture and U/S LEs without DVT.   Our orthopedic team Dr. Reyez wants patient in knee immobilizer and to be non-weight bearing.     Pt placed on observation for pain control and PT eval, MRI knee also ordered for further evaluation.  Will hold xarelto for now

## 2023-11-24 NOTE — ED CDU PROVIDER DISPOSITION NOTE - ATTENDING CONTRIBUTION TO CARE
I agree with the PA's note and was available for any issues/concerns. I was directly involved in patient care. My brief overall assessment is as follows:     Pt with hemarthrosis, no acute frature, no concern for infection. previus DVT resolved due to hemarthrosis and degree of pain and no DVT at this time, hold AC until re-eval by Vascular specialist next week with already scheduled repeat sonogram

## 2023-11-24 NOTE — ED CDU PROVIDER SUBSEQUENT DAY NOTE - NS ED ATTENDING STATEMENT MOD
This was a shared visit with the YOUSUF. I reviewed and verified the documentation and independently performed the documented:

## 2023-11-24 NOTE — PHYSICAL THERAPY INITIAL EVALUATION ADULT - PERTINENT HX OF CURRENT PROBLEM, REHAB EVAL
74 y/o female with pmhx ILD, s/p R knee replacement x9 weeks ago, RLE DVT dx 11/2 on xarelto presents to ED with c/o sudden right knee pain/swelling Xray and CT knee performed showing no acute fx, +hemarthrosis.  CT knee no acute fracture and U/S LEs without DVT.  Our orthopedic team Dr. Reyez wants patient in knee immobilizer and to be non-weight bearing.

## 2023-11-24 NOTE — PHYSICAL THERAPY INITIAL EVALUATION ADULT - ADDITIONAL COMMENTS
Pt reports living with spouse in a house with 4 SAND[address] c rail, and resides on the main level. Pt amb without DME in home and SAC in community and is independent with functional mobility, ADLs, and IADLs. Pt drives and is retired. Pt has support of family (pt reports spouse can assist as needed). Pt owns RW and SAC.

## 2023-11-24 NOTE — ED CDU PROVIDER SUBSEQUENT DAY NOTE - NS ED ROS FT
Gen: denies fever, chills  Skin: denies rashes  HEENT: denies visual changes, ear pain, nasal congestion, throat pain  Respiratory: denies LINARES, SOB, cough, wheezing  Cardiovascular: denies chest pain, palpitations, diaphoresis, LE edema  GI: denies abdominal pain, n/v/d  : denies dysuria, frequency, urgency, bowel/bladder incontinence  MSK: +rt knee pain. denies back pain, neck pain  Neuro: denies headache, dizziness, weakness, numbness  Psych: denies anxiety, depression, SI/HI, visual/auditory hallucinations

## 2023-11-24 NOTE — ED CDU PROVIDER SUBSEQUENT DAY NOTE - HISTORY
Pt resting comfortably at time of re-assessment. No events overnight. Pending MR knee. Will continue to monitor.
Pt resting comfortably at time of re-assessment. No events overnight. Pending MRI read. Will continue to monitor.
see progress note

## 2023-11-24 NOTE — ED CDU PROVIDER DISPOSITION NOTE - PATIENT PORTAL LINK FT
You can access the FollowMyHealth Patient Portal offered by [facility] by registering at the following website: [email or web address]. By joining [facility]’s FollowMyHealth portal, you will also be able to view your health information using other applications (apps) compatible with our system.

## 2023-11-24 NOTE — PHYSICAL THERAPY INITIAL EVALUATION ADULT - RANGE OF MOTION EXAMINATION, REHAB EVAL
except right knee NT due to immobilizer/bilateral upper extremity ROM was WFL (within functional limits)/bilateral lower extremity ROM was WFL (within functional limits)

## 2023-11-24 NOTE — ED CDU PROVIDER SUBSEQUENT DAY NOTE - ATTENDING APP SHARED VISIT CONTRIBUTION OF CARE
I agree with the PA's note and was available for any issues/concerns. I was directly involved in patient care. My brief overall assessment is as follows:     Pt with hemarthrosis of rt knee, MRI negative for acute fracture, pending further ortho recs. at this time would hold A/C has sono shows no DVT. has apt with Dr. Blanchard in <1week for repeat sono would defer to decision to restart aat that time. PT eval. DC home
I agree with the PA's note and was available for any issues/concerns. I was directly involved in patient care. My brief overall assessment is as follows:     Patient placed in CDU for hemarthrosis 2/2 xarelto, xarelto held, pending MRI.
I performed the initial face to face bedside interview with this patient regarding history of present illness, review of symptoms and relevant past medical, social and family history.  I completed an independent physical examination.  I was the initial provider who evaluated this patient. I have signed out the follow up of any pending tests (i.e. labs, radiological studies) to the ACP.  I have communicated the patient’s plan of care and disposition with the ACP.

## 2023-11-24 NOTE — ED CDU PROVIDER DISPOSITION NOTE - NSFOLLOWUPINSTRUCTIONS_ED_ALL_ED_FT
-Do not bear weight over right lower extremity use your walker to walk  - take  hydromorphone as prescribed for in case of severe pain  - Tylenol over-the-counter as needed for mild-moderate pain  - keep the knee immobilizer  - Narcan given in case of  hydromorphone adverse reaction/ include  severe drowsiness difficulty breathing as result of the hydromorphone  -: Follow-up with your primary care doctor and vascular doctor for further evaluation in 2-3 days  and bring the result    -: Follow-up with your orthopedic doctor  within 1 week and bring the results  - do not use the Xarelto as prescribed previously since there is no evidence of the DVT  - apply cold compress over the area  - come back in the emergency room if any worsening of the pain  despite taking medications for the pain, swelling, warmth ,  or developing fever or any new concerns  - Sprain    A sprain is a stretch or tear in one of the tough, fiber-like tissues (ligaments) in your body. This is caused by an injury to the area such as a twisting mechanism. Symptoms include pain, swelling, or bruising. Rest that area over the next several days and slowly resume activity when tolerated. Ice can help with swelling and pain.     SEEK IMMEDIATE MEDICAL CARE IF YOU HAVE ANY OF THE FOLLOWING SYMPTOMS: worsening pain, inability to move that body part, numbness or tingling.

## 2023-11-24 NOTE — ED ADULT NURSE REASSESSMENT NOTE - NS ED NURSE REASSESS COMMENT FT1
Assumed care of patient at this time 19:46 from dayshift RN Krupa, patient is awake, anxious, RR even and unlabored, patient says " im in a lot of pain my right foot is swollen," Patient reports having knee surgery 9 weeks ago and feeling pain while shopping earlier today. Denies sob, denies chest pain. Right knee appears swollen than left. Family at bedside. MD Su made aware of patient's pain. Waiting for ct.
Assumed care of pt @ 1930.  PT noted to be in recliner chair with left leg in brace.  Pt denies pain. IV patent and intact.  Pending MRI.  Purewick in place.  Pt placed on pulse ox SpO2 @ 98% RA. Will continue with current treatment plan
Patient is asking if she has a fever , temperature taken and within normal limits, denies sob, denies chest pain.
Patient requesting iv fluids and pelvic sonogram, ARNOL Gallego made aware, Pa stated "no new orders at this time." Patient's daughter on phone states " my mom has a clot inside of her and what if it traveled to her pelvis", Arnol Gallego made aware, Patient is able to urinate without difficulty, denies pelvic pain, denies chest pain, denies sob, patient states " I need iv fluids I don't want to go into kidney failure", patient informed that provider made aware.
Pt A&Ox4 resting comfortably, shows no s/s of distress RR even and unlabored. Pt updated on plan of care
Pt A&Ox4 resting comfortably, shows no s/s of distress RR even and unlabored. Pt updated on plan of care
Pt given Naloxone rescue kit. Pt educated on use, given opportunity to ask questions, and demonstrates understanding.
assumed care of pt from previous RN CG. pt a&ox4, daughter remains at bedside. IV remains intact, flushes without difficulty. pt remains on sp02 97%. pt moved from chair to wheel chair as pt request to go to BR. tolerated well. pt remains awaiting for MRI, aware of POC. RR wnl, safety maintained.
pt returned from MRI, pending results, pt resting @ this time, in NAD, respirations even and unlabored. POC discussed w/ patient. will continue to reassess.
Patient resting comfortably and calm on chair.  Pt AxO4, VSS, NSR on cardiac monitor. Pt appears in no distress at this time. Continue to be on CM .Sleep well OOB chair all night refused bed. Knee Immobilizer and ace wrap remain in place Rt knee remain elevated .C/o constipation and was medicated with Senna 2 tablet as per PACO Mazariegos order .Refused  po Dilaudid due to constipation but preferred  tylen9 2 tablets 650mg given as per MD order Safety measures taken, bed in low position, call bell within reach, side rails up x2.  Will continue to monitor.
Assumed care of pt at 07:15 as stated in report from RN MS. Charting as noted. Patient A&O x4, denies pain/discomfort, denies CP/SOB. Updated on the plan of care. Call bell within reach, bed locked in lowest position. IV site flushed w/ NS. No redness, swelling or pain noted to site. No signs of acute distress noted, safety maintained.
Received patient from erika RN.  Pt AxO4, VSS.  Pt denies chest pain/SOB at this time.  Cardio NSR on cardiac monitor.   IV insertion site intact , flushing without difficulty. Received this patient oob chair with family @ bed side not in distress . Rt knee immobilizer in place .Rt knee wrapped with ace wrap .S/p rt knee replacement c/o rt knee pain and was medicated as per MD order .Safety maintained support provided will continue to monitor closely Safety measures taken, bed in low position, call bell within reach, side rails up x2.  Plan of care explained.  Pt verbalized understanding.  Will continue to monitor.
Assumed care of patient at 1730, alert and oriented x4, resting in bed, c/o right knee pain. Right knee swelling noted, bilateral lower extremities cool to touch. Cap refill 3 seconds, right pedial pulse heard VIA doppler. PACO Perez at bedside to assess. Patient reports pelvic pressure and pain to right knee radiating to right foot. As Per PACO perez, Pt OOB to recliner chair for comfort. Pt reports she feels less pain since moving to chair. Primafit in place draining clear yellow urine. Awaiting MRI at this time.

## 2024-07-25 ENCOUNTER — APPOINTMENT (OUTPATIENT)
Dept: RHEUMATOLOGY | Facility: CLINIC | Age: 74
End: 2024-07-25
Payer: MEDICARE

## 2024-07-25 DIAGNOSIS — M81.0 AGE-RELATED OSTEOPOROSIS W/OUT CURRENT PATHOLOGICAL FRACTURE: ICD-10-CM

## 2024-07-25 DIAGNOSIS — M15.9 POLYOSTEOARTHRITIS, UNSPECIFIED: ICD-10-CM

## 2024-07-25 DIAGNOSIS — M54.2 CERVICALGIA: ICD-10-CM

## 2024-07-25 DIAGNOSIS — M79.89 OTHER SPECIFIED SOFT TISSUE DISORDERS: ICD-10-CM

## 2024-07-25 DIAGNOSIS — R76.8 OTHER SPECIFIED ABNORMAL IMMUNOLOGICAL FINDINGS IN SERUM: ICD-10-CM

## 2024-07-25 DIAGNOSIS — J84.10 PULMONARY FIBROSIS, UNSPECIFIED: ICD-10-CM

## 2024-07-25 DIAGNOSIS — M25.561 PAIN IN RIGHT KNEE: ICD-10-CM

## 2024-07-25 DIAGNOSIS — H04.123 DRY EYE SYNDROME OF BILATERAL LACRIMAL GLANDS: ICD-10-CM

## 2024-07-25 PROCEDURE — 99215 OFFICE O/P EST HI 40 MIN: CPT

## 2024-07-25 PROCEDURE — 36415 COLL VENOUS BLD VENIPUNCTURE: CPT

## 2024-07-25 PROCEDURE — G2211 COMPLEX E/M VISIT ADD ON: CPT

## 2024-07-25 NOTE — ASSESSMENT
[FreeTextEntry1] : 74 year old female  with: 1)  OA - worst in knees (R>L), now s/p right TKR though w/ persistent right knee pain.  Also w/ pain and diffuse swelling in hands - will r/o concurrent inflammatory arthritis   - Check labs   - Reiterated importance of exercise   - Tylenol prn (pt reports she can't tolerate NSAID's)   - warm compresses   - OTC topical analgesics   - Orthopedics f/u re: persistent right knee pain s/p TKR. 2)  Borderline ADELE (1:80):  no obvious si/sx of CTD and all sub-serologies negative..   3)  Pulmonary fibrosis:  pt does not exhibit any obvious signs/symptoms of an underlying connective tissue disease at this time, and w/u, including serologies, negative to date.   - pulm f/u. 4)Reported hx of osteoporosis:  Most recent DEXA reveals osteopenia.  Bisphosphonate treatment indicated based on FRAX, but pt declines.  R/B/A discusses.   - Cont calcium / vit D supplementation   - Discussed importance of weight bearing exercise

## 2024-07-25 NOTE — PHYSICAL EXAM
[General Appearance - Alert] : alert [General Appearance - In No Acute Distress] : in no acute distress [Sclera] : the sclera and conjunctiva were normal [Outer Ear] : the ears and nose were normal in appearance [Oropharynx] : the oropharynx was normal [Neck Appearance] : the appearance of the neck was normal [Neck Cervical Mass (___cm)] : no neck mass was observed [Jugular Venous Distention Increased] : there was no jugular-venous distention [Thyroid Diffuse Enlargement] : the thyroid was not enlarged [Thyroid Nodule] : there were no palpable thyroid nodules [Heart Rate And Rhythm] : heart rate was normal and rhythm regular [Heart Sounds] : normal S1 and S2 [Heart Sounds Gallop] : no gallops [Murmurs] : no murmurs [Heart Sounds Pericardial Friction Rub] : no pericardial rub [Edema] : there was no peripheral edema [Bowel Sounds] : normal bowel sounds [Abdomen Soft] : soft [Abdomen Tenderness] : non-tender [Abdomen Mass (___ Cm)] : no abdominal mass palpated [Cervical Lymph Nodes Enlarged Posterior Bilaterally] : posterior cervical [Cervical Lymph Nodes Enlarged Anterior Bilaterally] : anterior cervical [Supraclavicular Lymph Nodes Enlarged Bilaterally] : supraclavicular [Skin Color & Pigmentation] : normal skin color and pigmentation [Skin Turgor] : normal skin turgor [] : no rash [No Focal Deficits] : no focal deficits [Oriented To Time, Place, And Person] : oriented to person, place, and time [Impaired Insight] : insight and judgment were intact [Affect] : the affect was normal [FreeTextEntry1] : No synovitis;  (+)B/L Heberden's nodes, though non-tender; (+)tenderness in B/L 2nd-5th PIP's;  (+)diffuse swelling in B/L hands;  right knee w/ healing vertical scar;  left knee w/ crepitus

## 2024-07-25 NOTE — HISTORY OF PRESENT ILLNESS
[Arthralgias] : arthralgias [FreeTextEntry1] : 7/25/2024:  Pt underwent right TKR in 9/2023.  Her knee pain virtually resolved, but she soon began to experience severe pain/swelling in her right calf.  Her PMD ordered an LE Duplex, which revealed a DVT, for which she was started on Xarelto.  About 12 days later, she suddenly began to experience severe pain and swelling throughout her RLE - she was taken to the ED where she was found to have "an internal bleed".  Xarelto was therefore D/C'ed.  The RLE swelling persisted, so she was started on prednisone 10mg BID.  She began to experience AE's, including palpitations and vision changes (which her ophthalmologist attributed to steroids).  Prednisone was therefore tapered off.  She then experienced recurring episodes of feeling like she's going to pass out.  She underwent extensive w/u, which was reportedly unremarkable, so her PMD attributed it to adrenal insufficiency.  The symptoms eventually resolved.  She has continued rehab for the right knee.  Pt now reports that she experiences "inflammation" in her body - described as a fire throughout her entire body - improved w/ Tylenol.   She currently c/o pain in her B/L hands and knees.  She denies sny dyspnea or persistent coughing.   3/1/2022:  Feeling "the same" since last visit.  Still w/ B/L knee pain (R>L), unchanged.  Also w/ pain in her right hand.  No other new complaints. [Anorexia] : no anorexia [Weight Loss] : no weight loss [Malaise] : no malaise [Fever] : no fever [Chills] : no chills [Fatigue] : no fatigue [Malar Facial Rash] : no malar facial rash [Skin Lesions] : no lesions [Skin Nodules] : no skin nodules [Oral Ulcers] : no oral ulcers [Cough] : no cough [Dry Mouth] : no dry mouth [Dysphonia] : no dysphonia [Dysphagia] : no dysphagia [Shortness of Breath] : no shortness of breath [Chest Pain] : no chest pain [Joint Swelling] : no joint swelling [Joint Warmth] : no joint warmth [Joint Deformity] : no joint deformity [Decreased ROM] : no decreased range of motion [Morning Stiffness] : no morning stiffness [Falls] : no falls [Difficulty Standing] : no difficulty standing [Difficulty Walking] : no difficulty walking [Dyspnea] : no dyspnea [Myalgias] : no myalgias [Muscle Weakness] : no muscle weakness [Muscle Spasms] : no muscle spasms [Muscle Cramping] : no muscle cramping [Visual Changes] : no visual changes [Eye Pain] : no eye pain [Eye Redness] : no eye redness [Dry Eyes] : no dry eyes

## 2024-07-25 NOTE — HISTORY OF PRESENT ILLNESS
[Arthralgias] : arthralgias [FreeTextEntry1] : 7/25/2024:  Pt underwent right TKR in 9/2023.  Her knee pain virtually resolved, but she soon began to experience severe pain/swelling in her right calf.  Her PMD ordered an LE Duplex, which revealed a DVT, for which she was started on Xarelto.  About 12 days later, she suddenly began to experience severe pain and swelling throughout her RLE - she was taken to the ED where she was found to have "an internal bleed".  Xarelto was therefore D/C'ed.  The RLE swelling persisted, so she was started on prednisone 10mg BID.  She began to experience AE's, including palpitations and vision changes (which her ophthalmologist attributed to steroids).  Prednisone was therefore tapered off.  She then experienced recurring episodes of feeling like she's going to pass out.  She underwent extensive w/u, which was reportedly unremarkable, so her PMD attributed it to adrenal insufficiency.  The symptoms eventually resolved.  She has continued rehab for the right knee.  Pt now reports that she experiences "inflammation" in her body - described as a fire throughout her entire body - improved w/ Tylenol.   She currently c/o pain in her B/L hands and knees.  She denies sny dyspnea or persistent coughing.   3/1/2022:  Feeling "the same" since last visit.  Still w/ B/L knee pain (R>L), unchanged.  Also w/ pain in her right hand.  No other new complaints. [Anorexia] : no anorexia [Weight Loss] : no weight loss [Malaise] : no malaise [Fever] : no fever [Chills] : no chills 2 [Fatigue] : no fatigue [Malar Facial Rash] : no malar facial rash [Skin Lesions] : no lesions [Skin Nodules] : no skin nodules [Oral Ulcers] : no oral ulcers [Cough] : no cough [Dry Mouth] : no dry mouth [Dysphonia] : no dysphonia [Dysphagia] : no dysphagia [Shortness of Breath] : no shortness of breath [Chest Pain] : no chest pain [Joint Swelling] : no joint swelling [Joint Warmth] : no joint warmth [Joint Deformity] : no joint deformity [Decreased ROM] : no decreased range of motion [Morning Stiffness] : no morning stiffness [Falls] : no falls [Difficulty Standing] : no difficulty standing [Difficulty Walking] : no difficulty walking [Dyspnea] : no dyspnea [Myalgias] : no myalgias [Muscle Weakness] : no muscle weakness [Muscle Spasms] : no muscle spasms [Muscle Cramping] : no muscle cramping [Visual Changes] : no visual changes [Eye Pain] : no eye pain [Eye Redness] : no eye redness [Dry Eyes] : no dry eyes

## 2024-07-26 LAB
ALBUMIN SERPL ELPH-MCNC: 4.3 G/DL
ALP BLD-CCNC: 70 U/L
ALT SERPL-CCNC: 16 U/L
ANION GAP SERPL CALC-SCNC: 14 MMOL/L
AST SERPL-CCNC: 19 U/L
BASOPHILS # BLD AUTO: 0.07 K/UL
BASOPHILS NFR BLD AUTO: 0.8 %
BILIRUB SERPL-MCNC: 0.2 MG/DL
BUN SERPL-MCNC: 21 MG/DL
CALCIUM SERPL-MCNC: 10.2 MG/DL
CHLORIDE SERPL-SCNC: 105 MMOL/L
CO2 SERPL-SCNC: 23 MMOL/L
CREAT SERPL-MCNC: 0.58 MG/DL
CRP SERPL-MCNC: <3 MG/L
EGFR: 95 ML/MIN/1.73M2
EOSINOPHIL # BLD AUTO: 0.17 K/UL
EOSINOPHIL NFR BLD AUTO: 1.8 %
ERYTHROCYTE [SEDIMENTATION RATE] IN BLOOD BY WESTERGREN METHOD: 44 MM/HR
GLUCOSE SERPL-MCNC: 78 MG/DL
HCT VFR BLD CALC: 43.5 %
HGB BLD-MCNC: 13.7 G/DL
IMM GRANULOCYTES NFR BLD AUTO: 0.5 %
LYMPHOCYTES # BLD AUTO: 2.12 K/UL
LYMPHOCYTES NFR BLD AUTO: 22.8 %
MAN DIFF?: NORMAL
MCHC RBC-ENTMCNC: 29.7 PG
MCHC RBC-ENTMCNC: 31.5 GM/DL
MCV RBC AUTO: 94.2 FL
MONOCYTES # BLD AUTO: 1.09 K/UL
MONOCYTES NFR BLD AUTO: 11.7 %
NEUTROPHILS # BLD AUTO: 5.78 K/UL
NEUTROPHILS NFR BLD AUTO: 62.4 %
PLATELET # BLD AUTO: 268 K/UL
POTASSIUM SERPL-SCNC: 4.6 MMOL/L
PROT SERPL-MCNC: 7 G/DL
RBC # BLD: 4.62 M/UL
RBC # FLD: 14.7 %
RHEUMATOID FACT SER QL: 12 IU/ML
SODIUM SERPL-SCNC: 142 MMOL/L
WBC # FLD AUTO: 9.28 K/UL

## 2024-07-29 LAB
CCP AB SER IA-ACNC: <8 UNITS
RF+CCP IGG SER-IMP: NEGATIVE

## 2024-07-31 ENCOUNTER — NON-APPOINTMENT (OUTPATIENT)
Age: 74
End: 2024-07-31

## 2024-08-08 ENCOUNTER — APPOINTMENT (OUTPATIENT)
Dept: RHEUMATOLOGY | Facility: CLINIC | Age: 74
End: 2024-08-08

## 2024-08-08 PROBLEM — M15.0 PRIMARY OSTEOARTHRITIS INVOLVING MULTIPLE JOINTS: Status: ACTIVE | Noted: 2022-02-08

## 2024-08-08 PROCEDURE — G2211 COMPLEX E/M VISIT ADD ON: CPT

## 2024-08-08 PROCEDURE — 99214 OFFICE O/P EST MOD 30 MIN: CPT

## 2024-08-08 NOTE — ASSESSMENT
[FreeTextEntry1] : 74 year old female  with: 1)  OA - worst in knees (R>L), now s/p right TKR though w/ persistent right knee pain.  Also w/ pain and diffuse swelling in hands which reportedly improved on prednisone.  ?concurrent inflammatory arthritis (neil given elevated ESR, though CRP WNL).   - Recommended Plaquenil, but pt declined, as she does not want to take any additional medications at this time.  R/B/A discussed.   - Reiterated importance of exercise   - Tylenol prn (pt reports she can't tolerate NSAID's)   - warm compresses   - OTC topical analgesics   - Orthopedics f/u re: persistent right knee pain s/p TKR. 2)  Borderline ADELE (1:80):  no obvious si/sx of CTD and all sub-serologies negative.  3)  Pulmonary fibrosis:  pt does not exhibit any obvious signs/symptoms of an underlying connective tissue disease at this time, and w/u, including serologies, negative to date.   - pulm f/u. 4) Reported hx of osteoporosis:  Most recent DEXA reveals osteopenia.  Bisphosphonate treatment indicated based on FRAX, but pt declines.  R/B/A discusses.   - Cont calcium / vit D supplementation   - Reiterated importance of weight bearing exercise

## 2024-08-08 NOTE — HISTORY OF PRESENT ILLNESS
[Arthralgias] : arthralgias [FreeTextEntry1] : 8/8/2024:  Feeling better overall.  Pain in her hands has improved - now occurring more intermittently / has good days and bad days.  Diffuse "burning pain / "fire throughout the body" also improved overall.  Still w/ B/L knee pain, unchanged.  No new complaints. 7/25/2024:  Pt underwent right TKR in 9/2023.  Her knee pain virtually resolved, but she soon began to experience severe pain/swelling in her right calf.  Her PMD ordered an LE Duplex, which revealed a DVT, for which she was started on Xarelto.  About 12 days later, she suddenly began to experience severe pain and swelling throughout her RLE - she was taken to the ED where she was found to have "an internal bleed".  Xarelto was therefore D/C'ed.  The RLE swelling persisted, so she was started on prednisone 10mg BID.  She began to experience AE's, including palpitations and vision changes (which her ophthalmologist attributed to steroids).  Prednisone was therefore tapered off.  She then experienced recurring episodes of feeling like she's going to pass out.  She underwent extensive w/u, which was reportedly unremarkable, so her PMD attributed it to adrenal insufficiency.  The symptoms eventually resolved.  She has continued rehab for the right knee.  Pt now reports that she experiences "inflammation" in her body - described as a fire throughout her entire body - improved w/ Tylenol.   She currently c/o pain in her B/L hands and knees.  She denies sny dyspnea or persistent coughing.   3/1/2022:  Feeling "the same" since last visit.  Still w/ B/L knee pain (R>L), unchanged.  Also w/ pain in her right hand.  No other new complaints. [Anorexia] : no anorexia [Weight Loss] : no weight loss [Malaise] : no malaise [Fever] : no fever [Chills] : no chills [Fatigue] : no fatigue [Malar Facial Rash] : no malar facial rash [Skin Lesions] : no lesions [Skin Nodules] : no skin nodules [Oral Ulcers] : no oral ulcers [Cough] : no cough [Dry Mouth] : no dry mouth [Dysphonia] : no dysphonia [Dysphagia] : no dysphagia [Shortness of Breath] : no shortness of breath [Chest Pain] : no chest pain [Joint Swelling] : no joint swelling [Joint Warmth] : no joint warmth [Joint Deformity] : no joint deformity [Decreased ROM] : no decreased range of motion [Morning Stiffness] : no morning stiffness [Falls] : no falls [Difficulty Standing] : no difficulty standing [Difficulty Walking] : no difficulty walking [Dyspnea] : no dyspnea [Myalgias] : no myalgias [Muscle Weakness] : no muscle weakness [Muscle Spasms] : no muscle spasms [Muscle Cramping] : no muscle cramping [Visual Changes] : no visual changes [Eye Pain] : no eye pain [Eye Redness] : no eye redness [Dry Eyes] : no dry eyes

## 2024-12-05 ENCOUNTER — APPOINTMENT (OUTPATIENT)
Dept: RHEUMATOLOGY | Facility: CLINIC | Age: 74
End: 2024-12-05
Payer: MEDICARE

## 2024-12-05 VITALS
SYSTOLIC BLOOD PRESSURE: 124 MMHG | WEIGHT: 164 LBS | DIASTOLIC BLOOD PRESSURE: 80 MMHG | BODY MASS INDEX: 28 KG/M2 | TEMPERATURE: 98 F | HEIGHT: 64 IN | OXYGEN SATURATION: 98 % | HEART RATE: 82 BPM

## 2024-12-05 DIAGNOSIS — H04.123 DRY EYE SYNDROME OF BILATERAL LACRIMAL GLANDS: ICD-10-CM

## 2024-12-05 DIAGNOSIS — R76.8 OTHER SPECIFIED ABNORMAL IMMUNOLOGICAL FINDINGS IN SERUM: ICD-10-CM

## 2024-12-05 DIAGNOSIS — M81.0 AGE-RELATED OSTEOPOROSIS W/OUT CURRENT PATHOLOGICAL FRACTURE: ICD-10-CM

## 2024-12-05 DIAGNOSIS — M15.0 PRIMARY GENERALIZED (OSTEO)ARTHRITIS: ICD-10-CM

## 2024-12-05 DIAGNOSIS — M79.89 OTHER SPECIFIED SOFT TISSUE DISORDERS: ICD-10-CM

## 2024-12-05 DIAGNOSIS — J84.10 PULMONARY FIBROSIS, UNSPECIFIED: ICD-10-CM

## 2024-12-05 DIAGNOSIS — M25.561 PAIN IN RIGHT KNEE: ICD-10-CM

## 2024-12-05 DIAGNOSIS — M54.2 CERVICALGIA: ICD-10-CM

## 2024-12-05 PROCEDURE — 99214 OFFICE O/P EST MOD 30 MIN: CPT

## 2024-12-05 PROCEDURE — G2211 COMPLEX E/M VISIT ADD ON: CPT

## 2025-04-08 ENCOUNTER — APPOINTMENT (OUTPATIENT)
Dept: RHEUMATOLOGY | Facility: CLINIC | Age: 75
End: 2025-04-08
Payer: MEDICARE

## 2025-04-08 ENCOUNTER — NON-APPOINTMENT (OUTPATIENT)
Age: 75
End: 2025-04-08

## 2025-04-08 VITALS
TEMPERATURE: 97.7 F | OXYGEN SATURATION: 96 % | HEART RATE: 60 BPM | DIASTOLIC BLOOD PRESSURE: 90 MMHG | HEIGHT: 64 IN | WEIGHT: 171 LBS | BODY MASS INDEX: 29.19 KG/M2 | RESPIRATION RATE: 17 BRPM | SYSTOLIC BLOOD PRESSURE: 150 MMHG

## 2025-04-08 VITALS — SYSTOLIC BLOOD PRESSURE: 130 MMHG | DIASTOLIC BLOOD PRESSURE: 82 MMHG

## 2025-04-08 DIAGNOSIS — H04.123 DRY EYE SYNDROME OF BILATERAL LACRIMAL GLANDS: ICD-10-CM

## 2025-04-08 DIAGNOSIS — M79.89 OTHER SPECIFIED SOFT TISSUE DISORDERS: ICD-10-CM

## 2025-04-08 DIAGNOSIS — M81.0 AGE-RELATED OSTEOPOROSIS W/OUT CURRENT PATHOLOGICAL FRACTURE: ICD-10-CM

## 2025-04-08 DIAGNOSIS — M15.0 PRIMARY GENERALIZED (OSTEO)ARTHRITIS: ICD-10-CM

## 2025-04-08 DIAGNOSIS — J84.10 PULMONARY FIBROSIS, UNSPECIFIED: ICD-10-CM

## 2025-04-08 DIAGNOSIS — M54.2 CERVICALGIA: ICD-10-CM

## 2025-04-08 DIAGNOSIS — R76.8 OTHER SPECIFIED ABNORMAL IMMUNOLOGICAL FINDINGS IN SERUM: ICD-10-CM

## 2025-04-08 DIAGNOSIS — M25.561 PAIN IN RIGHT KNEE: ICD-10-CM

## 2025-04-08 PROCEDURE — G2211 COMPLEX E/M VISIT ADD ON: CPT

## 2025-04-08 PROCEDURE — 99214 OFFICE O/P EST MOD 30 MIN: CPT

## 2025-08-22 ENCOUNTER — OFFICE (OUTPATIENT)
Dept: URBAN - METROPOLITAN AREA CLINIC 94 | Facility: CLINIC | Age: 75
Setting detail: OPHTHALMOLOGY
End: 2025-08-22
Payer: COMMERCIAL

## 2025-08-22 DIAGNOSIS — H01.005: ICD-10-CM

## 2025-08-22 DIAGNOSIS — H02.825: ICD-10-CM

## 2025-08-22 DIAGNOSIS — H02.423: ICD-10-CM

## 2025-08-22 DIAGNOSIS — H25.13: ICD-10-CM

## 2025-08-22 DIAGNOSIS — H02.831: ICD-10-CM

## 2025-08-22 PROBLEM — H02.834 DERMATOCHALASIS; RIGHT UPPER LID, LEFT UPPER LID: Status: ACTIVE | Noted: 2025-08-22

## 2025-08-22 PROCEDURE — 99204 OFFICE O/P NEW MOD 45 MIN: CPT | Mod: 25 | Performed by: OPHTHALMOLOGY

## 2025-08-22 PROCEDURE — 67840 REMOVE EYELID LESION: CPT | Mod: LT | Performed by: OPHTHALMOLOGY

## 2025-08-22 ASSESSMENT — TONOMETRY
OS_IOP_MMHG: 16
OD_IOP_MMHG: 15

## 2025-08-22 ASSESSMENT — LID EXAM ASSESSMENTS: OS_MEIBOMITIS: LLL 4+

## 2025-08-22 ASSESSMENT — LID POSITION - PTOSIS
OS_PTOSIS: LUL 2+
OD_PTOSIS: RUL 3+

## 2025-08-22 ASSESSMENT — LID POSITION - DERMATOCHALASIS
OD_DERMATOCHALASIS: RUL 2+
OS_DERMATOCHALASIS: LUL 2+

## 2025-08-22 ASSESSMENT — VISUAL ACUITY
OD_BCVA: 20/60
OS_BCVA: 20/60

## 2025-08-22 ASSESSMENT — CORNEAL SURGICAL SCARRING: OS_SCARRING: ANTERIOR STROMAL
